# Patient Record
Sex: FEMALE | Race: BLACK OR AFRICAN AMERICAN | NOT HISPANIC OR LATINO | URBAN - METROPOLITAN AREA
[De-identification: names, ages, dates, MRNs, and addresses within clinical notes are randomized per-mention and may not be internally consistent; named-entity substitution may affect disease eponyms.]

---

## 2017-11-14 ENCOUNTER — EMERGENCY (EMERGENCY)
Facility: HOSPITAL | Age: 47
LOS: 0 days | Discharge: HOME | End: 2017-11-14
Attending: EMERGENCY MEDICINE
Payer: COMMERCIAL

## 2017-11-14 VITALS
TEMPERATURE: 98 F | DIASTOLIC BLOOD PRESSURE: 70 MMHG | RESPIRATION RATE: 15 BRPM | HEIGHT: 66 IN | HEART RATE: 83 BPM | SYSTOLIC BLOOD PRESSURE: 121 MMHG | WEIGHT: 154.98 LBS | OXYGEN SATURATION: 100 %

## 2017-11-14 VITALS
TEMPERATURE: 98 F | RESPIRATION RATE: 17 BRPM | DIASTOLIC BLOOD PRESSURE: 74 MMHG | HEART RATE: 78 BPM | SYSTOLIC BLOOD PRESSURE: 133 MMHG | OXYGEN SATURATION: 100 %

## 2017-11-14 DIAGNOSIS — R07.9 CHEST PAIN, UNSPECIFIED: ICD-10-CM

## 2017-11-14 LAB
ALBUMIN SERPL ELPH-MCNC: 3.3 G/DL — SIGNIFICANT CHANGE UP (ref 3.3–5)
ALP SERPL-CCNC: 52 U/L — SIGNIFICANT CHANGE UP (ref 40–120)
ALT FLD-CCNC: 24 U/L — SIGNIFICANT CHANGE UP (ref 12–78)
ANION GAP SERPL CALC-SCNC: 9 MMOL/L — SIGNIFICANT CHANGE UP (ref 5–17)
APTT BLD: 28.9 SEC — SIGNIFICANT CHANGE UP (ref 27.5–37.4)
AST SERPL-CCNC: 16 U/L — SIGNIFICANT CHANGE UP (ref 15–37)
BASOPHILS # BLD AUTO: 0.1 K/UL — SIGNIFICANT CHANGE UP (ref 0–0.2)
BASOPHILS NFR BLD AUTO: 0.9 % — SIGNIFICANT CHANGE UP (ref 0–2)
BILIRUB SERPL-MCNC: 0.2 MG/DL — SIGNIFICANT CHANGE UP (ref 0.2–1.2)
BUN SERPL-MCNC: 17 MG/DL — SIGNIFICANT CHANGE UP (ref 7–23)
CALCIUM SERPL-MCNC: 8.3 MG/DL — LOW (ref 8.5–10.1)
CHLORIDE SERPL-SCNC: 107 MMOL/L — SIGNIFICANT CHANGE UP (ref 96–108)
CK MB BLD-MCNC: 1 % — SIGNIFICANT CHANGE UP (ref 0–3.5)
CK MB BLD-MCNC: 1 % — SIGNIFICANT CHANGE UP (ref 0–3.5)
CK MB CFR SERPL CALC: 1 NG/ML — SIGNIFICANT CHANGE UP (ref 0.5–3.6)
CK MB CFR SERPL CALC: 1.1 NG/ML — SIGNIFICANT CHANGE UP (ref 0.5–3.6)
CK SERPL-CCNC: 107 U/L — SIGNIFICANT CHANGE UP (ref 26–192)
CK SERPL-CCNC: 96 U/L — SIGNIFICANT CHANGE UP (ref 26–192)
CO2 SERPL-SCNC: 22 MMOL/L — SIGNIFICANT CHANGE UP (ref 22–31)
CREAT SERPL-MCNC: 0.78 MG/DL — SIGNIFICANT CHANGE UP (ref 0.5–1.3)
D DIMER BLD IA.RAPID-MCNC: 215 NG/ML DDU — SIGNIFICANT CHANGE UP
EOSINOPHIL # BLD AUTO: 0.1 K/UL — SIGNIFICANT CHANGE UP (ref 0–0.5)
EOSINOPHIL NFR BLD AUTO: 1.8 % — SIGNIFICANT CHANGE UP (ref 0–6)
GLUCOSE SERPL-MCNC: 107 MG/DL — HIGH (ref 70–99)
HCG SERPL-ACNC: <1 MIU/ML — SIGNIFICANT CHANGE UP
HCT VFR BLD CALC: 33 % — LOW (ref 34.5–45)
HGB BLD-MCNC: 10.3 G/DL — LOW (ref 11.5–15.5)
INR BLD: 1.03 RATIO — SIGNIFICANT CHANGE UP (ref 0.88–1.16)
LYMPHOCYTES # BLD AUTO: 1.6 K/UL — SIGNIFICANT CHANGE UP (ref 1–3.3)
LYMPHOCYTES # BLD AUTO: 19.4 % — SIGNIFICANT CHANGE UP (ref 13–44)
MCHC RBC-ENTMCNC: 25.2 PG — LOW (ref 27–34)
MCHC RBC-ENTMCNC: 31.1 GM/DL — LOW (ref 32–36)
MCV RBC AUTO: 81 FL — SIGNIFICANT CHANGE UP (ref 80–100)
MONOCYTES # BLD AUTO: 0.9 K/UL — SIGNIFICANT CHANGE UP (ref 0–0.9)
MONOCYTES NFR BLD AUTO: 11.4 % — SIGNIFICANT CHANGE UP (ref 2–14)
NEUTROPHILS # BLD AUTO: 5.4 K/UL — SIGNIFICANT CHANGE UP (ref 1.8–7.4)
NEUTROPHILS NFR BLD AUTO: 66.5 % — SIGNIFICANT CHANGE UP (ref 43–77)
NT-PROBNP SERPL-SCNC: 32 PG/ML — SIGNIFICANT CHANGE UP (ref 0–125)
PLATELET # BLD AUTO: 354 K/UL — SIGNIFICANT CHANGE UP (ref 150–400)
POTASSIUM SERPL-MCNC: 4 MMOL/L — SIGNIFICANT CHANGE UP (ref 3.5–5.3)
POTASSIUM SERPL-SCNC: 4 MMOL/L — SIGNIFICANT CHANGE UP (ref 3.5–5.3)
PROT SERPL-MCNC: 7.2 GM/DL — SIGNIFICANT CHANGE UP (ref 6–8.3)
PROTHROM AB SERPL-ACNC: 11.2 SEC — SIGNIFICANT CHANGE UP (ref 9.8–12.7)
RBC # BLD: 4.08 M/UL — SIGNIFICANT CHANGE UP (ref 3.8–5.2)
RBC # FLD: 14.1 % — SIGNIFICANT CHANGE UP (ref 11–15)
SODIUM SERPL-SCNC: 138 MMOL/L — SIGNIFICANT CHANGE UP (ref 135–145)
TROPONIN I SERPL-MCNC: <.015 NG/ML — SIGNIFICANT CHANGE UP (ref 0.01–0.04)
TROPONIN I SERPL-MCNC: <.015 NG/ML — SIGNIFICANT CHANGE UP (ref 0.01–0.04)
WBC # BLD: 8.1 K/UL — SIGNIFICANT CHANGE UP (ref 3.8–10.5)
WBC # FLD AUTO: 8.1 K/UL — SIGNIFICANT CHANGE UP (ref 3.8–10.5)

## 2017-11-14 PROCEDURE — 71275 CT ANGIOGRAPHY CHEST: CPT | Mod: 26

## 2017-11-14 PROCEDURE — 99285 EMERGENCY DEPT VISIT HI MDM: CPT | Mod: 25

## 2017-11-14 PROCEDURE — 93010 ELECTROCARDIOGRAM REPORT: CPT

## 2017-11-14 RX ORDER — ASPIRIN/CALCIUM CARB/MAGNESIUM 324 MG
325 TABLET ORAL ONCE
Qty: 0 | Refills: 0 | Status: COMPLETED | OUTPATIENT
Start: 2017-11-14 | End: 2017-11-14

## 2017-11-14 RX ADMIN — Medication 30 MILLILITER(S): at 02:55

## 2017-11-14 RX ADMIN — Medication 325 MILLIGRAM(S): at 02:54

## 2017-11-14 NOTE — ED ADULT TRIAGE NOTE - CHIEF COMPLAINT QUOTE
BIBA  pt c/o midsternal chest pain radiating to back x 30 minutes.  with SOB.  pt took antacid prior to coming, thinking it might be her acid reflux

## 2017-11-14 NOTE — ED PROVIDER NOTE - OBJECTIVE STATEMENT
Pertinent PMH/PSH/FHx/SHx and Review of Systems contained within:    47yo F w Pertinent PMH/PSH/FHx/SHx and Review of Systems contained within:    47yo F w PMH of GERD presents to ED for eval of CP.  Pt states she was sitting and watching TV when she suddenly developed chest tightness & pain.  Denies fever, chills, cough, abd pain, vomiting, syncope, diarrhea.  Pt took her GERD medications prior to arrival w/o relief.    No fever/chills, No photophobia/eye pain/changes in vision, No ear pain/sore throat/dysphagia, No palpitations, no cough/wheeze/stridor, No abdominal pain, No N/V/D, no dysuria/frequency/discharge, No neck/back pain, no rash, no changes in neurological status/function.

## 2017-11-14 NOTE — ED ADULT NURSE NOTE - OBJECTIVE STATEMENT
pt came in with c/o chest pain / epigastric pain, denies nausea or vomiting, pt has hx of GERD , will monitor.

## 2017-11-14 NOTE — ED PROVIDER NOTE - MEDICAL DECISION MAKING DETAILS
Pt w CP.  VSS in NAD.  Sx improved in ED.  Initial CE WNL.  CTA neg for PE.  Pending repeat CE.  Patient care transitioned to incoming team.  All decisions regarding the progression of care will be made at their discretion. Pt w CP.  VSS in NAD.  Sx improved in ED.  Initial CE WNL.  CTA neg for PE.  Pending repeat CE.  Patient care transitioned to incoming team.  All decisions regarding the progression of care will be made at their discretion.   2nd trop negative, CTA chest neg for pe. patient feeling well. will dc.

## 2017-11-14 NOTE — ED ADULT TRIAGE NOTE - CADM TRG TX PRIOR TO ARRIVAL
Per Rene Mack - spoke with patient. Patient to take pain medication. Rene Mack would order Steroid Enema. Patient to see GI Dr. Barbara Christiansen. Phone number given - [phone]. Patient to call is he has problem getting an appointment.   Patient stated under
Telephone call to patient notified steroid enema prescription was sent to pharmacy.
medications/antacid

## 2023-03-06 ENCOUNTER — APPOINTMENT (OUTPATIENT)
Dept: ORTHOPEDIC SURGERY | Facility: CLINIC | Age: 53
End: 2023-03-06
Payer: COMMERCIAL

## 2023-03-06 VITALS — WEIGHT: 165 LBS | HEIGHT: 66 IN | BODY MASS INDEX: 26.52 KG/M2

## 2023-03-06 DIAGNOSIS — M77.11 LATERAL EPICONDYLITIS, RIGHT ELBOW: ICD-10-CM

## 2023-03-06 DIAGNOSIS — M25.521 PAIN IN RIGHT ELBOW: ICD-10-CM

## 2023-03-06 DIAGNOSIS — Z78.9 OTHER SPECIFIED HEALTH STATUS: ICD-10-CM

## 2023-03-06 PROCEDURE — 99204 OFFICE O/P NEW MOD 45 MIN: CPT

## 2023-03-06 PROCEDURE — 73080 X-RAY EXAM OF ELBOW: CPT | Mod: RT

## 2023-03-06 PROCEDURE — 73130 X-RAY EXAM OF HAND: CPT | Mod: RT

## 2023-03-08 NOTE — HISTORY OF PRESENT ILLNESS
[5] : 5 [9] : 9 [Dull/Aching] : dull/aching [Localized] : localized [Radiating] : radiating [Sharp] : sharp [Shooting] : shooting [Intermittent] : intermittent [Household chores] : household chores [Leisure] : leisure [Work] : work [Sleep] : sleep [Social interactions] : social interactions [Nothing helps with pain getting better] : Nothing helps with pain getting better [Exercising] : exercising [Lying in bed] : lying in bed [Full time] : Work status: full time [de-identified] : 3/6/2023: rhd 51 yo female here with complaint of 3 mos right elbow pain x 3 mos.\par Pt has hx of right volar wrist surgery for tendon laceration suffered in 2004 when she was washing dishes and a glass broke.\par Pt reports of diminished sensation to the right hand since that time.\par \par PMH: anemia (iron deficiency and fibroids).\par Allergies: NKDA.  [] : no

## 2023-03-08 NOTE — ASSESSMENT
[FreeTextEntry1] : attempt to aspirate right middle finger mass was unsuccessful.\par recommend mass excision. \par Pt will be scheduled for right middle finger finger mass excision in the near future.\par Risks including but not limited to infection, blood loss, tendon damage and delayed tendon disruption, muscle damage, nerve damage, stiffness, pain, arthritis, loss of function, potential for secondary surgery, loss of limb or life. The patient had the opportunity to ask questions and all questions were answered to their satisfaction.\par All r/b/a were discussed. \par \par \par HEP provided for right lateral epicondylitis.\par Recommend use of tennis elbow brace for activity.\par \par

## 2023-03-08 NOTE — REASON FOR VISIT
[FreeTextEntry2] : 03/06/2023 :WALKER HICKS , a 52 year old female, presents today for right elbow pain radiates to wrist, onset a few months \par

## 2023-03-08 NOTE — IMAGING
[There are no fractures, subluxations or dislocations. No significant abnormalities are seen] : There are no fractures, subluxations or dislocations. No significant abnormalities are seen [Right] : right hand [de-identified] : Right volar wrist scar noted from previous tendon repair.\par Diminished sensation noted to the median nerve distribution.\par Pt has atrophy to the median nerve distribution.\par  strength is 5/5, intrinsic  strength is 5/5.\par Volar wrist with swelling and increased sensitivity. \par Right wrist ROM is full and pain free. \par Palpation over this area produces pain which radiates to the middle and index fingers, therefore Tinel Sign is not performed here.\par \par Cubital Tunnel Tinel Sign is negative. \par Right elbow with full and pain free ROM.\par +TTP over the lateral epicondyle only.\par Pain is exacerbated with wrist extension.\par There is no ligamentous laxity noted.\par Right elbow strength is 5/5 in all planes.\par  [FreeTextEntry1] : mild multidigit OA noted / no acute bony pathology.

## 2023-03-30 ENCOUNTER — RESULT REVIEW (OUTPATIENT)
Age: 53
End: 2023-03-30

## 2023-03-30 RX ORDER — ACETAMINOPHEN AND CODEINE 300; 30 MG/1; MG/1
300-30 TABLET ORAL
Qty: 12 | Refills: 0 | Status: ACTIVE | COMMUNITY
Start: 2023-03-30 | End: 1900-01-01

## 2023-03-31 ENCOUNTER — APPOINTMENT (OUTPATIENT)
Age: 53
End: 2023-03-31
Payer: COMMERCIAL

## 2023-03-31 PROCEDURE — 26116 EXC HAND TUM DEEP < 1.5 CM: CPT | Mod: F7

## 2023-03-31 PROCEDURE — 26116 EXC HAND TUM DEEP < 1.5 CM: CPT | Mod: AS,F7

## 2023-04-10 ENCOUNTER — APPOINTMENT (OUTPATIENT)
Dept: ORTHOPEDIC SURGERY | Facility: CLINIC | Age: 53
End: 2023-04-10
Payer: COMMERCIAL

## 2023-04-10 VITALS — HEIGHT: 66 IN | WEIGHT: 165 LBS | BODY MASS INDEX: 26.52 KG/M2

## 2023-04-10 DIAGNOSIS — Z63.5 DISRUPTION OF FAMILY BY SEPARATION AND DIVORCE: ICD-10-CM

## 2023-04-10 DIAGNOSIS — Z78.9 OTHER SPECIFIED HEALTH STATUS: ICD-10-CM

## 2023-04-10 DIAGNOSIS — R22.31 LOCALIZED SWELLING, MASS AND LUMP, RIGHT UPPER LIMB: ICD-10-CM

## 2023-04-10 PROCEDURE — 99024 POSTOP FOLLOW-UP VISIT: CPT

## 2023-04-10 SDOH — SOCIAL STABILITY - SOCIAL INSECURITY: DISRUPTION OF FAMILY BY SEPARATION AND DIVORCE: Z63.5

## 2023-04-10 NOTE — IMAGING
[de-identified] : wound clean dry and intact no erythema no drainage FAROM NV unchanged sutures removed\par \par \par Right volar wrist scar noted from previous tendon repair.\par Diminished sensation noted to the median nerve distribution.\par Pt has atrophy to the median nerve distribution.\par  strength is 5/5, intrinsic  strength is 5/5.\par Volar wrist with swelling and increased sensitivity. \par Right wrist ROM is full and pain free. \par Palpation over this area produces pain which radiates to the middle and index fingers, therefore Tinel Sign is not performed here.\par \par Cubital Tunnel Tinel Sign is negative. \par Right elbow with full and pain free ROM.\par mildly +TTP over the lateral epicondyle only.\par Pain is exacerbated with wrist extension.\par There is no ligamentous laxity noted.\par Right elbow strength is 5/5 in all planes.\par

## 2023-04-10 NOTE — ASSESSMENT
[FreeTextEntry1] : \par Pt doing well postop.\par Will rto in s/p emg (to assess carpal vs cubital tunnel syndrome)

## 2023-04-10 NOTE — HISTORY OF PRESENT ILLNESS
[5] : 5 [9] : 9 [Dull/Aching] : dull/aching [Localized] : localized [Radiating] : radiating [Sharp] : sharp [Shooting] : shooting [Intermittent] : intermittent [Household chores] : household chores [Leisure] : leisure [Work] : work [Sleep] : sleep [Social interactions] : social interactions [Nothing helps with pain getting better] : Nothing helps with pain getting better [Exercising] : exercising [Lying in bed] : lying in bed [Full time] : Work status: full time [de-identified] : 4/10/2023: Pt here for POV1. Pathology shows a giant cell tumor. PT is comfortable.\par \par 3/6/2023: rhd 51 yo female here with complaint of 3 mos right elbow pain x 3 mos.\par Pt has hx of right volar wrist surgery for tendon laceration suffered in 2004 when she was washing dishes and a glass broke.\par Pt reports of diminished sensation to the right hand since that time.\par \par PMH: anemia (iron deficiency and fibroids).\par Allergies: NKDA.  [] : no

## 2023-04-11 ENCOUNTER — APPOINTMENT (OUTPATIENT)
Dept: ORTHOPEDIC SURGERY | Facility: CLINIC | Age: 53
End: 2023-04-11

## 2023-04-12 ENCOUNTER — APPOINTMENT (OUTPATIENT)
Dept: NEUROLOGY | Facility: CLINIC | Age: 53
End: 2023-04-12
Payer: COMMERCIAL

## 2023-04-12 DIAGNOSIS — M79.2 NEURALGIA AND NEURITIS, UNSPECIFIED: ICD-10-CM

## 2023-04-12 DIAGNOSIS — G56.11 OTHER LESIONS OF MEDIAN NERVE, RIGHT UPPER LIMB: ICD-10-CM

## 2023-04-12 PROCEDURE — 95886 MUSC TEST DONE W/N TEST COMP: CPT

## 2023-04-12 PROCEDURE — 95911 NRV CNDJ TEST 9-10 STUDIES: CPT

## 2023-04-27 ENCOUNTER — APPOINTMENT (OUTPATIENT)
Dept: ORTHOPEDIC SURGERY | Facility: CLINIC | Age: 53
End: 2023-04-27
Payer: COMMERCIAL

## 2023-04-27 VITALS — BODY MASS INDEX: 26.52 KG/M2 | WEIGHT: 165 LBS | HEIGHT: 66 IN

## 2023-04-27 PROCEDURE — 99214 OFFICE O/P EST MOD 30 MIN: CPT | Mod: 24

## 2023-04-27 NOTE — HISTORY OF PRESENT ILLNESS
[5] : 5 [9] : 9 [Dull/Aching] : dull/aching [Localized] : localized [Radiating] : radiating [Sharp] : sharp [Shooting] : shooting [Intermittent] : intermittent [Household chores] : household chores [Leisure] : leisure [Work] : work [Sleep] : sleep [Social interactions] : social interactions [Nothing helps with pain getting better] : Nothing helps with pain getting better [Exercising] : exercising [Lying in bed] : lying in bed [Full time] : Work status: full time [de-identified] : 4/27/23:  Symptoms are unchanged.  N/t is constant.\par \par EMG:\par moderate right CTS\par \par 4/10/2023: Pt here for POV1. Pathology shows a giant cell tumor. PT is comfortable.\par \par 3/6/2023: rhd 51 yo female here with complaint of 3 mos right elbow pain x 3 mos.\par Pt has hx of right volar wrist surgery for tendon laceration suffered in 2004 when she was washing dishes and a glass broke.\par Pt reports of diminished sensation to the right hand since that time.\par \par PMH: anemia (iron deficiency and fibroids).\par Allergies: NKDA.  [] : no [FreeTextEntry6] : numbness [FreeTextEntry1] : right middle finger [de-identified] : EMG

## 2023-04-27 NOTE — IMAGING
[de-identified] : wound clean dry and intact no erythema no drainage FAROM NV unchanged sutures removed\par \par \par Right volar wrist scar noted from previous tendon repair.\par Diminished sensation noted to the median nerve distribution.\par Pt has atrophy to the median nerve distribution.\par  strength is 5/5, intrinsic  strength is 5/5.\par Volar wrist with swelling and increased sensitivity. \par Right wrist ROM is full and pain free. \par Palpation over this area produces pain which radiates to the middle and index fingers, therefore Tinel Sign is not performed here.\par \par Cubital Tunnel Tinel Sign is negative. \par Right elbow with full and pain free ROM.\par mildly +TTP over the lateral epicondyle only.\par Pain is exacerbated with wrist extension.\par There is no ligamentous laxity noted.\par Right elbow strength is 5/5 in all planes.\par

## 2023-05-11 RX ORDER — ACETAMINOPHEN AND CODEINE PHOSPHATE 300; 30 MG/1; MG/1
300-30 TABLET ORAL EVERY 6 HOURS
Qty: 12 | Refills: 0 | Status: ACTIVE | COMMUNITY
Start: 2023-05-11 | End: 1900-01-01

## 2023-05-12 ENCOUNTER — APPOINTMENT (OUTPATIENT)
Age: 53
End: 2023-05-12
Payer: COMMERCIAL

## 2023-05-12 PROCEDURE — 29848 WRIST ENDOSCOPY/SURGERY: CPT | Mod: AS,79,RT

## 2023-05-12 PROCEDURE — 29848 WRIST ENDOSCOPY/SURGERY: CPT | Mod: 79,RT

## 2023-05-22 ENCOUNTER — APPOINTMENT (OUTPATIENT)
Dept: ORTHOPEDIC SURGERY | Facility: CLINIC | Age: 53
End: 2023-05-22
Payer: COMMERCIAL

## 2023-05-22 VITALS — BODY MASS INDEX: 26.52 KG/M2 | HEIGHT: 66 IN | WEIGHT: 165 LBS

## 2023-05-22 DIAGNOSIS — G56.01 CARPAL TUNNEL SYNDROME, RIGHT UPPER LIMB: ICD-10-CM

## 2023-05-22 PROCEDURE — 99024 POSTOP FOLLOW-UP VISIT: CPT

## 2023-05-22 NOTE — HISTORY OF PRESENT ILLNESS
[Gradual] : gradual [3] : 3 [Occasional] : occasional [de-identified] : Patient is here for her first post op appt.  Sx: 5/12/22\par doing well- mild finger stiffness. \par n/t improved  [] : no [FreeTextEntry6] : discomfort [FreeTextEntry9] : hand exercises [de-identified] : 5/12/23 [de-identified] : 5/12/23 [de-identified] : right hand

## 2023-05-22 NOTE — IMAGING
[de-identified] : wound clean dry and intact\par no erythema\par no drainage\par FAROM\par NV unchanged\par sutures removed